# Patient Record
Sex: MALE | URBAN - METROPOLITAN AREA
[De-identification: names, ages, dates, MRNs, and addresses within clinical notes are randomized per-mention and may not be internally consistent; named-entity substitution may affect disease eponyms.]

---

## 2018-01-15 ENCOUNTER — APPOINTMENT (RX ONLY)
Dept: URBAN - METROPOLITAN AREA CLINIC 114 | Facility: CLINIC | Age: 73
Setting detail: DERMATOLOGY
End: 2018-01-15

## 2018-01-15 DIAGNOSIS — L24.9 IRRITANT CONTACT DERMATITIS, UNSPECIFIED CAUSE: ICD-10-CM

## 2018-01-15 DIAGNOSIS — L85.3 XEROSIS CUTIS: ICD-10-CM

## 2018-01-15 PROBLEM — I10 ESSENTIAL (PRIMARY) HYPERTENSION: Status: ACTIVE | Noted: 2018-01-15

## 2018-01-15 PROBLEM — Z85.828 PERSONAL HISTORY OF OTHER MALIGNANT NEOPLASM OF SKIN: Status: ACTIVE | Noted: 2018-01-15

## 2018-01-15 PROBLEM — L57.0 ACTINIC KERATOSIS: Status: ACTIVE | Noted: 2018-01-15

## 2018-01-15 PROBLEM — M12.9 ARTHROPATHY, UNSPECIFIED: Status: ACTIVE | Noted: 2018-01-15

## 2018-01-15 PROCEDURE — ? COUNSELING

## 2018-01-15 PROCEDURE — ? TREATMENT REGIMEN

## 2018-01-15 PROCEDURE — 99202 OFFICE O/P NEW SF 15 MIN: CPT

## 2018-01-15 ASSESSMENT — LOCATION SIMPLE DESCRIPTION DERM
LOCATION SIMPLE: LEFT LIP
LOCATION SIMPLE: RIGHT FOREARM
LOCATION SIMPLE: LEFT FOREARM

## 2018-01-15 ASSESSMENT — LOCATION DETAILED DESCRIPTION DERM
LOCATION DETAILED: LEFT DISTAL DORSAL FOREARM
LOCATION DETAILED: RIGHT DISTAL DORSAL FOREARM
LOCATION DETAILED: LEFT UPPER CUTANEOUS LIP

## 2018-01-15 ASSESSMENT — LOCATION ZONE DERM
LOCATION ZONE: ARM
LOCATION ZONE: LIP

## 2018-01-15 NOTE — PROCEDURE: MIPS QUALITY
Quality 226: Preventive Care And Screening: Tobacco Use: Screening And Cessation Intervention: Patient screened for tobacco and never smoked
Quality 111:Pneumonia Vaccination Status For Older Adults: Pneumococcal Vaccination Previously Received
Quality 131: Pain Assessment And Follow-Up: Pain assessment using a standardized tool is documented as negative, no follow-up plan required
Detail Level: Detailed
Quality 47: Advance Care Plan: Advance Care Planning discussed and documented in the medical record; patient did not wish or was not able to name a surrogate decision maker or provide an advance care plan.
Quality 130: Documentation Of Current Medications In The Medical Record: Current Medications Documented
Quality 110: Preventive Care And Screening: Influenza Immunization: Influenza Immunization Administered during Influenza season

## 2022-06-04 ENCOUNTER — TELEPHONE ENCOUNTER (OUTPATIENT)
Dept: URBAN - METROPOLITAN AREA CLINIC 68 | Facility: CLINIC | Age: 77
End: 2022-06-04

## 2022-06-05 ENCOUNTER — TELEPHONE ENCOUNTER (OUTPATIENT)
Dept: URBAN - METROPOLITAN AREA CLINIC 68 | Facility: CLINIC | Age: 77
End: 2022-06-05

## 2022-06-25 ENCOUNTER — TELEPHONE ENCOUNTER (OUTPATIENT)
Age: 77
End: 2022-06-25

## 2022-06-26 ENCOUNTER — TELEPHONE ENCOUNTER (OUTPATIENT)
Age: 77
End: 2022-06-26